# Patient Record
Sex: MALE | Race: WHITE | NOT HISPANIC OR LATINO | URBAN - METROPOLITAN AREA
[De-identification: names, ages, dates, MRNs, and addresses within clinical notes are randomized per-mention and may not be internally consistent; named-entity substitution may affect disease eponyms.]

---

## 2021-12-23 ENCOUNTER — OFFICE VISIT (OUTPATIENT)
Dept: URBAN - METROPOLITAN AREA CLINIC 45 | Facility: CLINIC | Age: 61
End: 2021-12-23
Payer: COMMERCIAL

## 2021-12-23 DIAGNOSIS — H40.1134 PRIMARY OPEN-ANGLE GLAUCOMA, BILATERAL, INDETERMINATE STAGE: ICD-10-CM

## 2021-12-23 DIAGNOSIS — H57.11 OCULAR PAIN, RIGHT EYE: ICD-10-CM

## 2021-12-23 DIAGNOSIS — Z94.7 CORNEAL TRANSPLANT STATUS: ICD-10-CM

## 2021-12-23 DIAGNOSIS — H04.123 DRY EYE SYNDROME OF BILATERAL LACRIMAL GLANDS: Primary | ICD-10-CM

## 2021-12-23 PROCEDURE — 99204 OFFICE O/P NEW MOD 45 MIN: CPT | Performed by: OPTOMETRIST

## 2021-12-23 RX ORDER — OFLOXACIN 3 MG/ML
0.3 % SOLUTION/ DROPS OPHTHALMIC
Qty: 5 | Refills: 0 | Status: INACTIVE
Start: 2021-12-23 | End: 2021-12-28

## 2021-12-23 RX ORDER — BRIMONIDINE TARTRATE 2 MG/ML
0.2 % SOLUTION/ DROPS OPHTHALMIC
Qty: 5 | Refills: 6 | Status: ACTIVE
Start: 2021-12-23

## 2021-12-23 ASSESSMENT — INTRAOCULAR PRESSURE
OS: 21
OD: 24

## 2021-12-23 NOTE — IMPRESSION/PLAN
Impression: Corneal transplant status: Z94.7.  Plan: PRED ACETATE QID OD, CONSULT WITH [facility] SPECIALIST

## 2021-12-23 NOTE — IMPRESSION/PLAN
Impression: Primary open-angle glaucoma, bilateral, indeterminate stage: H40.1134. Plan: START BRIMONIDINE TID OU, CONSULT WITH [facility] SPECIALIST. TUBE WELL COVERED.  PT IN A LOT OF PAIN, RECOMMEND HEAVY LUBRICATION AND PRED ACETATE QID OD

## 2021-12-28 ENCOUNTER — OFFICE VISIT (OUTPATIENT)
Dept: URBAN - METROPOLITAN AREA CLINIC 33 | Facility: CLINIC | Age: 61
End: 2021-12-28
Payer: COMMERCIAL

## 2021-12-28 DIAGNOSIS — T86.8401 CORNEAL TRANSPLANT REJECTION, RIGHT EYE: Primary | ICD-10-CM

## 2021-12-28 PROCEDURE — 99203 OFFICE O/P NEW LOW 30 MIN: CPT | Performed by: OPHTHALMOLOGY

## 2021-12-28 RX ORDER — OFLOXACIN 3 MG/ML
0.3 % SOLUTION/ DROPS OPHTHALMIC
Qty: 5 | Refills: 1 | Status: ACTIVE
Start: 2021-12-28

## 2021-12-28 RX ORDER — PREDNISOLONE ACETATE 10 MG/ML
1 % SUSPENSION/ DROPS OPHTHALMIC
Qty: 10 | Refills: 1 | Status: ACTIVE
Start: 2021-12-28

## 2021-12-28 NOTE — IMPRESSION/PLAN
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 Condition: quality of life issue. Vision: vision affected. TSSPCIOL OD, Valve OD, Valve OS  Condition: quality of life issue. Symptoms: could improve with surgery. Vision: vision affected. Plan: Discussed diagnosis in detail with patient. Discussed risks of progression. Discussed treatment options with patient. Surgical treatment is required. Surgical risks and benefits were discussed, explained and understood by patient. Patient elects to have surgery.  RL-2

## 2022-02-22 ENCOUNTER — SURGERY (OUTPATIENT)
Dept: URBAN - METROPOLITAN AREA SURGERY 15 | Facility: SURGERY | Age: 62
End: 2022-02-22
Payer: COMMERCIAL

## 2022-02-22 PROCEDURE — 65755 CORNEAL TRANSPLANT: CPT | Performed by: OPHTHALMOLOGY

## 2022-02-23 ENCOUNTER — POST-OPERATIVE VISIT (OUTPATIENT)
Dept: URBAN - METROPOLITAN AREA CLINIC 33 | Facility: CLINIC | Age: 62
End: 2022-02-23

## 2022-02-23 PROCEDURE — 99024 POSTOP FOLLOW-UP VISIT: CPT | Performed by: OPTOMETRIST

## 2022-02-23 ASSESSMENT — INTRAOCULAR PRESSURE: OD: 10

## 2022-02-23 NOTE — IMPRESSION/PLAN
Impression: S/P Penetrating keratoplasty OD - 1 Day. Encounter for surgical aftercare following surgery on a sense organ  Z48.810. Post operative instructions reviewed. Continue Ofloxacin and Prednisolone as directed.  Plan: --Continue Ofloxacin 0.3% QID OD 
--Continue Prednisolone acetate 1% QID OD

## 2022-02-28 ENCOUNTER — POST-OPERATIVE VISIT (OUTPATIENT)
Dept: URBAN - METROPOLITAN AREA CLINIC 33 | Facility: CLINIC | Age: 62
End: 2022-02-28
Payer: COMMERCIAL

## 2022-02-28 DIAGNOSIS — Z48.810 ENCOUNTER FOR SURGICAL AFTERCARE FOLLOWING SURGERY ON A SENSE ORGAN: Primary | ICD-10-CM

## 2022-02-28 PROCEDURE — 99024 POSTOP FOLLOW-UP VISIT: CPT | Performed by: OPHTHALMOLOGY

## 2022-02-28 ASSESSMENT — INTRAOCULAR PRESSURE
OS: 18
OD: 7

## 2022-02-28 NOTE — IMPRESSION/PLAN
Impression: S/P Penetrating keratoplasty OD - 6 Days. Encounter for surgical aftercare following surgery on a sense organ  Z48.810. Excellent post op course   Post operative instructions reviewed - Condition is improving - Plan: Pt has been commuting from [place] for surgery and post op. OK to extend post op 3 weeks. Discussed aniridia. Discussed artificial Iris. Defer at this time.  Ofloxacin QID OD X 1 week then D/C, Continue PF QID OD, do not taper, do not discontinue

## 2022-03-25 ENCOUNTER — POST-OPERATIVE VISIT (OUTPATIENT)
Dept: URBAN - METROPOLITAN AREA CLINIC 33 | Facility: CLINIC | Age: 62
End: 2022-03-25
Payer: COMMERCIAL

## 2022-03-25 PROCEDURE — 99024 POSTOP FOLLOW-UP VISIT: CPT | Performed by: OPHTHALMOLOGY

## 2022-03-25 RX ORDER — MOXIFLOXACIN 5 MG/ML
0.5 % SOLUTION/ DROPS OPHTHALMIC
Qty: 5 | Refills: 3 | Status: ACTIVE
Start: 2022-03-25

## 2022-03-25 RX ORDER — ATROPINE SULFATE 10 MG/ML
1 % SOLUTION/ DROPS OPHTHALMIC
Qty: 2.5 | Refills: 1 | Status: ACTIVE
Start: 2022-03-25

## 2022-03-25 ASSESSMENT — INTRAOCULAR PRESSURE: OD: 13

## 2022-03-25 NOTE — IMPRESSION/PLAN
Impression: S/P Penetrating keratoplasty OD - 31 Days. Encounter for surgical aftercare following surgery on a sense organ  Z48.810. Excellent post op course   Post operative instructions reviewed - Plan: Ofloxacin and Pred QID OD, Atropine BID OD.

## 2022-04-29 ENCOUNTER — POST-OPERATIVE VISIT (OUTPATIENT)
Dept: URBAN - METROPOLITAN AREA CLINIC 33 | Facility: CLINIC | Age: 62
End: 2022-04-29
Payer: COMMERCIAL

## 2022-04-29 DIAGNOSIS — Z48.810 ENCOUNTER FOR SURGICAL AFTERCARE FOLLOWING SURGERY ON A SENSE ORGAN: Primary | ICD-10-CM

## 2022-04-29 PROCEDURE — 99024 POSTOP FOLLOW-UP VISIT: CPT | Performed by: OPHTHALMOLOGY

## 2022-04-29 ASSESSMENT — INTRAOCULAR PRESSURE
OD: 7
OS: 12

## 2022-04-29 NOTE — IMPRESSION/PLAN
Impression: S/P Penetrating keratoplasty OD - 66 Days. Encounter for surgical aftercare following surgery on a sense organ  Z48.810. Excellent post op course   Post operative instructions reviewed - Condition is improving - Plan: Discussed possible custom contact lens vs implantable iris in the future for photophobia. OK to use Ofloxacin QD OD until gone. Continue PF QID OD.  ATS prn

## 2022-07-25 ENCOUNTER — OFFICE VISIT (OUTPATIENT)
Dept: URBAN - METROPOLITAN AREA CLINIC 33 | Facility: CLINIC | Age: 62
End: 2022-07-25
Payer: COMMERCIAL

## 2022-07-25 DIAGNOSIS — H43.311 VITREOUS MEMBRANES AND STRANDS, RIGHT EYE: ICD-10-CM

## 2022-07-25 DIAGNOSIS — H33.011 RETINAL DETACHMENT WITH SINGLE BREAK, RIGHT EYE: Primary | ICD-10-CM

## 2022-07-25 PROCEDURE — 92134 CPTRZ OPH DX IMG PST SGM RTA: CPT | Performed by: OPHTHALMOLOGY

## 2022-07-25 PROCEDURE — 99214 OFFICE O/P EST MOD 30 MIN: CPT | Performed by: OPHTHALMOLOGY

## 2022-07-25 RX ORDER — PREDNISOLONE ACETATE 10 MG/ML
1 % SUSPENSION/ DROPS OPHTHALMIC
Qty: 10 | Refills: 5 | Status: ACTIVE
Start: 2022-07-25

## 2022-07-25 RX ORDER — OFLOXACIN 3 MG/ML
0.3 % SOLUTION/ DROPS OPHTHALMIC
Qty: 5 | Refills: 3 | Status: ACTIVE
Start: 2022-07-25

## 2022-07-25 ASSESSMENT — INTRAOCULAR PRESSURE
OD: 5
OS: 12

## 2022-07-25 NOTE — IMPRESSION/PLAN
Impression: Complex Retinal detachment with single break, right eye: H33.011. Right. Condition: new problem addtl w/u needed. Vision: vision affected. Plan: Discussed diagnosis in detail with patient. Discussed risks of progression. Discussed concerns due to Hx of Corneal Transplant, previous TSPCIOL and present Scar Tissue. Also patient drove from [place]. Discussed surgery with Gas vs S. O. Surgical treatment is recommended to repair the retina PPVx, EL, AFx, ERMx, NO GAS, possible Perf, S. O. MB, Kenalog RIGHT EYE. Surgical risks and benefits were discussed, explained and understood by patient. Unable to tell how much vision will be recovered. Discussed S. O., patient will be able to travel with S. O. Advise patient that once the retina is stable post surgery, he will need additional surgery to remove the S. O. All questions answered. Patient elects to proceed with recommendation. RL1. Educational material provided to patient. Erx Prednisolone and Ofloxacin to patient's pharmacy.

## 2022-07-31 ENCOUNTER — POST-OPERATIVE VISIT (OUTPATIENT)
Dept: URBAN - METROPOLITAN AREA CLINIC 33 | Facility: CLINIC | Age: 62
End: 2022-07-31
Payer: COMMERCIAL

## 2022-07-31 DIAGNOSIS — Z96.1 PRESENCE OF INTRAOCULAR LENS: Primary | ICD-10-CM

## 2022-07-31 PROCEDURE — 99024 POSTOP FOLLOW-UP VISIT: CPT | Performed by: OPTOMETRIST

## 2022-07-31 ASSESSMENT — INTRAOCULAR PRESSURE
OD: 8
OS: 15

## 2022-07-31 NOTE — IMPRESSION/PLAN
Impression: S/P 45510; PARS PLANA VITRECTOMY 25G; Laser photocoagulation - ENDOLASER; AFX; Epiretinal Membranectomy; ; Intravitreal injection of medications - KENALOG OD - 1 Day. Presence of intraocular lens  Z96.1. Post operative instructions reviewed - Plan: Discussed findings with patient and re-educated on PO instructions. Instructed patient to call if any significant pain, loss of vision, flashes or new floaters.  --Continue all meds

## 2022-08-05 ENCOUNTER — POST-OPERATIVE VISIT (OUTPATIENT)
Dept: URBAN - METROPOLITAN AREA CLINIC 23 | Facility: CLINIC | Age: 62
End: 2022-08-05
Payer: COMMERCIAL

## 2022-08-05 DIAGNOSIS — Z48.810 ENCOUNTER FOR SURGICAL AFTERCARE FOLLOWING SURGERY ON A SENSE ORGAN: Primary | ICD-10-CM

## 2022-08-05 PROCEDURE — 99024 POSTOP FOLLOW-UP VISIT: CPT | Performed by: OPHTHALMOLOGY

## 2022-08-05 ASSESSMENT — INTRAOCULAR PRESSURE
OD: 7
OS: 13

## 2022-08-05 NOTE — IMPRESSION/PLAN
Impression: S/P 41989; PARS PLANA VITRECTOMY 25G; Laser photocoagulation - ENDOLASER; AFX; Epiretinal Membranectomy; ; Intravitreal injection of medications - KENALOG OD - 6 Days. Encounter for surgical aftercare following surgery on a sense organ  Z48.810. Excellent post op course   Post operative instructions reviewed - Condition is improving - Plan: Exam and Optos shows the retina to be attached with silicone oil. Pt may travel without regard to elevation changes since silicone oil was used. Pt may d/c. ofloxacin and continue the Prednisolone qid OD. OCT shows stable. Reminded pt to shake the Prednisolone bottle before use. Pt may sleep with a pillow or 2, do not lay flat. Pt may lift under 20 pounds. Will reassess the retina in 3-4 weeks.

## 2022-09-01 ENCOUNTER — POST-OPERATIVE VISIT (OUTPATIENT)
Dept: URBAN - METROPOLITAN AREA CLINIC 33 | Facility: CLINIC | Age: 62
End: 2022-09-01
Payer: COMMERCIAL

## 2022-09-01 DIAGNOSIS — Z48.810 ENCOUNTER FOR SURGICAL AFTERCARE FOLLOWING SURGERY ON A SENSE ORGAN: Primary | ICD-10-CM

## 2022-09-01 PROCEDURE — 99024 POSTOP FOLLOW-UP VISIT: CPT | Performed by: OPHTHALMOLOGY

## 2022-09-01 RX ORDER — PREDNISOLONE ACETATE 10 MG/ML
1 % SUSPENSION/ DROPS OPHTHALMIC
Qty: 10 | Refills: 5 | Status: ACTIVE
Start: 2022-09-01

## 2022-09-01 ASSESSMENT — INTRAOCULAR PRESSURE
OS: 12
OD: 4

## 2022-09-01 NOTE — IMPRESSION/PLAN
Impression: S/P 56109; PARS PLANA VITRECTOMY 25G; Laser photocoagulation - ENDOLASER; AFX; Epiretinal Membranectomy; ; Intravitreal injection of medications - KENALOG OD - 33 Days. Encounter for surgical aftercare following surgery on a sense organ  Z48.810. Plan: OCT OD shows no view and Optos OD shows fibrosis. Exam shows retina appears to be attached w/ S.O. - hazy view. Recommend increasing Prednisolone Q1-2hrs while awake. Will reassess the retina in 3 weeks.

## 2022-09-29 ENCOUNTER — POST-OPERATIVE VISIT (OUTPATIENT)
Dept: URBAN - METROPOLITAN AREA CLINIC 33 | Facility: CLINIC | Age: 62
End: 2022-09-29
Payer: COMMERCIAL

## 2022-09-29 DIAGNOSIS — Z48.810 ENCOUNTER FOR SURGICAL AFTERCARE FOLLOWING SURGERY ON A SENSE ORGAN: Primary | ICD-10-CM

## 2022-09-29 PROCEDURE — 99024 POSTOP FOLLOW-UP VISIT: CPT | Performed by: OPHTHALMOLOGY

## 2022-09-29 ASSESSMENT — INTRAOCULAR PRESSURE
OS: 13
OD: 4

## 2022-09-29 NOTE — IMPRESSION/PLAN
Impression: S/P 91576; PARS PLANA VITRECTOMY 25G; Laser photocoagulation - ENDOLASER; AFX; Epiretinal Membranectomy; ; Intravitreal injection of medications - KENALOG OD - 61 Days. Encounter for surgical aftercare following surgery on a sense organ  Z48.810. Excellent post op course; Condition is improving - Plan: Unable to obtain OCT and Optos OD. Exam OD shows minimal corneal edema, macula appears attached, fluid underneath the retina inferiorly. Continue Prednisolone Q2hrs OD while awake. Will consider surgery remove S.O., and place heavy liquid to repair the retina. Will reassess the retina in 3 weeks.

## 2022-10-17 ENCOUNTER — POST-OPERATIVE VISIT (OUTPATIENT)
Dept: URBAN - METROPOLITAN AREA CLINIC 33 | Facility: CLINIC | Age: 62
End: 2022-10-17
Payer: COMMERCIAL

## 2022-10-17 DIAGNOSIS — Z48.810 ENCOUNTER FOR SURGICAL AFTERCARE FOLLOWING SURGERY ON A SENSE ORGAN: Primary | ICD-10-CM

## 2022-10-17 PROCEDURE — 99024 POSTOP FOLLOW-UP VISIT: CPT | Performed by: OPHTHALMOLOGY

## 2022-10-17 RX ORDER — OFLOXACIN 3 MG/ML
0.3 % SOLUTION/ DROPS OPHTHALMIC
Qty: 5 | Refills: 3 | Status: ACTIVE
Start: 2022-10-17

## 2022-10-17 RX ORDER — PREDNISOLONE ACETATE 10 MG/ML
1 % SUSPENSION/ DROPS OPHTHALMIC
Qty: 10 | Refills: 5 | Status: ACTIVE
Start: 2022-10-17

## 2022-10-17 ASSESSMENT — INTRAOCULAR PRESSURE
OS: 14
OD: 4

## 2022-10-17 NOTE — IMPRESSION/PLAN
Impression: S/P 16886; PARS PLANA VITRECTOMY 25G; Laser photocoagulation - ENDOLASER; AFX; Epiretinal Membranectomy; ; Intravitreal injection of medications - KENALOG OD - 79 Days. Encounter for surgical aftercare following surgery on a sense organ  Z48.810. Excellent post op course; Condition is improving - Plan: Exam OD shows corneal edema is improving, retina partially attached. Unable to obtain OCT OD. Discussed diagnosis in detail with patient. Discussed risks of progression. Surgical treatment is recommended to repair the retina with heavy liquid and remove scar tissue PPVx RIGHT EYE. Surgical risks and benefits were discussed, explained and understood by patient. Unable to tell how much vision will be recovered. All questions answered. Patient elects to proceed with recommendation. RL1. Continue Prednisolone Q2hrs OD (e'rxed refills for Ofloxacin and Prednisolone).

## 2022-11-03 ENCOUNTER — POST-OPERATIVE VISIT (OUTPATIENT)
Dept: URBAN - METROPOLITAN AREA CLINIC 33 | Facility: CLINIC | Age: 62
End: 2022-11-03
Payer: COMMERCIAL

## 2022-11-03 DIAGNOSIS — Z48.810 ENCOUNTER FOR SURGICAL AFTERCARE FOLLOWING SURGERY ON A SENSE ORGAN: Primary | ICD-10-CM

## 2022-11-03 PROCEDURE — 99024 POSTOP FOLLOW-UP VISIT: CPT | Performed by: OPTOMETRIST

## 2022-11-03 ASSESSMENT — INTRAOCULAR PRESSURE: OD: 15

## 2022-11-03 NOTE — IMPRESSION/PLAN
Impression: S/P 25ga PPVx; Epiretinal Membranectomy; ICG, perflurocorbon; oil removal; Intraocular Injections of gas or other vitreous substitutes OD - 1 Day. Encounter for surgical aftercare following surgery on a sense organ  Z48.810. Post operative instructions reviewed -Sit upright for 10 days.  Continue care with Dr. Shannan Lomas: Continue Prednisolone and Ofloxacin QID OD

## 2022-11-10 ENCOUNTER — POST-OPERATIVE VISIT (OUTPATIENT)
Dept: URBAN - METROPOLITAN AREA CLINIC 33 | Facility: CLINIC | Age: 62
End: 2022-11-10
Payer: COMMERCIAL

## 2022-11-10 DIAGNOSIS — Z48.810 ENCOUNTER FOR SURGICAL AFTERCARE FOLLOWING SURGERY ON A SENSE ORGAN: Primary | ICD-10-CM

## 2022-11-10 PROCEDURE — 99024 POSTOP FOLLOW-UP VISIT: CPT | Performed by: OPHTHALMOLOGY

## 2022-11-10 ASSESSMENT — INTRAOCULAR PRESSURE: OD: 10

## 2022-11-10 NOTE — IMPRESSION/PLAN
Impression: S/P 25ga PPVx; Epiretinal Membranectomy; ICG, perflurocorbon; oil removal; Intraocular Injections of gas or other vitreous substitutes OD - 8 Days. Encounter for surgical aftercare following surgery on a sense organ  Z48.810. Excellent post op course   Post operative instructions reviewed - Condition is improving - Plan: Exam OD shows the retina appears in good position w/Perfl. Will reassess in 2 wks, if stable will discussed Perfl removal. Continue PF OD QID and d/c Ofloxacin after today.

## 2022-11-28 ENCOUNTER — POST-OPERATIVE VISIT (OUTPATIENT)
Dept: URBAN - METROPOLITAN AREA CLINIC 33 | Facility: CLINIC | Age: 62
End: 2022-11-28
Payer: COMMERCIAL

## 2022-11-28 DIAGNOSIS — Z48.810 ENCOUNTER FOR SURGICAL AFTERCARE FOLLOWING SURGERY ON A SENSE ORGAN: Primary | ICD-10-CM

## 2022-11-28 PROCEDURE — 99024 POSTOP FOLLOW-UP VISIT: CPT | Performed by: OPHTHALMOLOGY

## 2022-11-28 ASSESSMENT — INTRAOCULAR PRESSURE
OD: 5
OS: 12

## 2022-11-28 NOTE — IMPRESSION/PLAN
Impression: S/P 25ga PPVx; Epiretinal Membranectomy; ICG, perflurocorbon; oil removal; Intraocular Injections of gas or other vitreous substitutes OD - 26 Days. Encounter for surgical aftercare following surgery on a sense organ  Z48.810. Excellent post op course   Post operative instructions reviewed - Condition is improving - Plan: Exam OD shows the retina appears to be in good position. OCT OD unable and Optos OD shows a hazy view. Discussed and recommend surgery to remove the Pefl for possible vision improvement. Discussed surgery in great detail with risks and benefits. All questions answered. Patient elects to proceed with recommendation. Schedule PPVx 25 ga, AFx, Perf Removal, Kenalog, PCO / ACO removal  RIGHT EYE. Advise to continue using Prednisolone OD QID until surgery, restart Ofloxacin 1 day prior to sx (pt has refills at his pharmacy.

## 2022-12-15 ENCOUNTER — POST-OPERATIVE VISIT (OUTPATIENT)
Dept: URBAN - METROPOLITAN AREA CLINIC 33 | Facility: CLINIC | Age: 62
End: 2022-12-15
Payer: COMMERCIAL

## 2022-12-15 DIAGNOSIS — Z48.810 ENCOUNTER FOR SURGICAL AFTERCARE FOLLOWING SURGERY ON A SENSE ORGAN: Primary | ICD-10-CM

## 2022-12-15 PROCEDURE — 99024 POSTOP FOLLOW-UP VISIT: CPT | Performed by: OPTOMETRIST

## 2022-12-15 ASSESSMENT — INTRAOCULAR PRESSURE
OD: 12
OS: 12

## 2022-12-15 NOTE — IMPRESSION/PLAN
Impression: S/P [place] Vitrectomy 25ga; Perfluoron Removal; Epiretinal Membranectomy; AFX (Air Fluid Gas Exchange); Endo laser OD - 1 Day. Encounter for surgical aftercare following surgery on a sense organ  Z48.810. Patient to be upright or Left side down for 10 days per Dr Suraj Guadalupe. No increase in altitude or elevation by car or plane until instructed. Continue care with Dr. Suraj Guadalupe. Plan: Start Prednisolone QID OD Start Ofloxacin QID OD

## 2022-12-22 ENCOUNTER — POST-OPERATIVE VISIT (OUTPATIENT)
Dept: URBAN - METROPOLITAN AREA CLINIC 33 | Facility: CLINIC | Age: 62
End: 2022-12-22
Payer: COMMERCIAL

## 2022-12-22 DIAGNOSIS — Z48.810 ENCOUNTER FOR SURGICAL AFTERCARE FOLLOWING SURGERY ON A SENSE ORGAN: Primary | ICD-10-CM

## 2022-12-22 PROCEDURE — 99024 POSTOP FOLLOW-UP VISIT: CPT | Performed by: OPHTHALMOLOGY

## 2022-12-22 ASSESSMENT — INTRAOCULAR PRESSURE
OS: 14
OD: 13

## 2022-12-22 NOTE — IMPRESSION/PLAN
Impression: S/P [place] Vitrectomy 25ga; Perfluoron Removal; Epiretinal Membranectomy; AFX (Air Fluid Gas Exchange); Endo laser OD - 8 Days. Encounter for surgical aftercare following surgery on a sense organ  Z48.810. Excellent post op course   Post operative instructions reviewed - Plan: Exam OD shows a hazy view due to gas bubble. Good red reflex, the retina appears grossly attached. Pt may d/ Ofloxacin and continue Prednisolone QID OD. Will reassess the retain in 4 weeks. Advised pateint that he still may not travel to higher elevations.

## 2023-01-23 ENCOUNTER — POST-OPERATIVE VISIT (OUTPATIENT)
Dept: URBAN - METROPOLITAN AREA CLINIC 33 | Facility: CLINIC | Age: 63
End: 2023-01-23
Payer: COMMERCIAL

## 2023-01-23 DIAGNOSIS — Z48.810 ENCOUNTER FOR SURGICAL AFTERCARE FOLLOWING SURGERY ON A SENSE ORGAN: Primary | ICD-10-CM

## 2023-01-23 PROCEDURE — 99024 POSTOP FOLLOW-UP VISIT: CPT | Performed by: OPHTHALMOLOGY

## 2023-01-23 NOTE — IMPRESSION/PLAN
Impression: S/P [place] Vitrectomy 25ga; Perfluoron Removal; Epiretinal Membranectomy; AFX (Air Fluid Gas Exchange); Endo laser OD - 40 Days. Encounter for surgical aftercare following surgery on a sense organ  Z48.810. Excellent post op course   Post operative instructions reviewed - Plan: Exam OD shows [facility]. Pt states that he has had a cold and coughing and sneezing and he now has blood on the eye. Explained to patient that the blood on the front of the eye is normal and harmless. The retina appears stable and attached, with gas bubble. Will reassess the retina in 4 weeks. Vision should improve as the gas bubble decreases.

## 2023-02-20 ENCOUNTER — POST-OPERATIVE VISIT (OUTPATIENT)
Dept: URBAN - METROPOLITAN AREA CLINIC 33 | Facility: CLINIC | Age: 63
End: 2023-02-20
Payer: COMMERCIAL

## 2023-02-20 DIAGNOSIS — Z48.810 ENCOUNTER FOR SURGICAL AFTERCARE FOLLOWING SURGERY ON A SENSE ORGAN: Primary | ICD-10-CM

## 2023-02-20 PROCEDURE — 99024 POSTOP FOLLOW-UP VISIT: CPT | Performed by: OPHTHALMOLOGY

## 2023-02-20 RX ORDER — PREDNISOLONE ACETATE 10 MG/ML
1 % SUSPENSION/ DROPS OPHTHALMIC
Qty: 10 | Refills: 5 | Status: ACTIVE
Start: 2023-02-20

## 2023-02-20 ASSESSMENT — INTRAOCULAR PRESSURE
OS: 9
OD: 0

## 2023-02-20 NOTE — IMPRESSION/PLAN
Impression: S/P [place] Vitrectomy 25ga; Perfluoron Removal; Epiretinal Membranectomy; AFX (Air Fluid Gas Exchange); Endo laser OD - 68 Days. Encounter for surgical aftercare following surgery on a sense organ  Z48.810. Excellent post op course   Post operative instructions reviewed - Plan: Exam OD shows inflammation on the IOL, Hazy view. When light beam from the slit lamp entered patients eye, he was able to see it. Pt to increase Prednisolone 4-8x daily OD. Will reassess the retina in 1 month.   OCT OD unable

## 2023-03-20 ENCOUNTER — OFFICE VISIT (OUTPATIENT)
Dept: URBAN - METROPOLITAN AREA CLINIC 33 | Facility: CLINIC | Age: 63
End: 2023-03-20
Payer: COMMERCIAL

## 2023-03-20 DIAGNOSIS — H33.011 RETINAL DETACHMENT WITH SINGLE BREAK, RIGHT EYE: Primary | ICD-10-CM

## 2023-03-20 PROCEDURE — 99213 OFFICE O/P EST LOW 20 MIN: CPT | Performed by: OPHTHALMOLOGY

## 2023-03-20 PROCEDURE — 76512 OPH US DX B-SCAN: CPT | Performed by: OPHTHALMOLOGY

## 2023-03-20 ASSESSMENT — INTRAOCULAR PRESSURE
OS: 12
OD: 13

## 2023-03-20 NOTE — IMPRESSION/PLAN
Impression: s/p PPVx for Repair of Complex Retinal detachment with single break, right eye x 3 surgeries: H33.011. Right. Condition: stable post sx. Vision: vision affected. s/p PPVx for Repair of Complex RD OD 12/14/22 w/gas
s/p PPVx for Repair of Complex RD OD 11/02 w/Perf
s/p PPVx for Repair of Complex RD OD 07/30 w/S. O. Plan: Discussed diagnosis in detail with patient. Will proceed with B- Scan to assess the retina. B-Scan OD shows no RD. Patient can travel, there is no gas in the eye. Recommend a follow - up in 2 mos. Continue with PF OD 6 - 8 x's a day. Patient deferred OCT and Optos.

## 2023-03-23 ENCOUNTER — OFFICE VISIT (OUTPATIENT)
Dept: URBAN - METROPOLITAN AREA CLINIC 45 | Facility: CLINIC | Age: 63
End: 2023-03-23
Payer: COMMERCIAL

## 2023-03-23 DIAGNOSIS — T86.8401 CORNEAL TRANSPLANT REJECTION, RIGHT EYE: Primary | ICD-10-CM

## 2023-03-23 DIAGNOSIS — H52.4 PRESBYOPIA: ICD-10-CM

## 2023-03-23 DIAGNOSIS — H17.89 OTHER CORNEAL SCARS AND OPACITIES: ICD-10-CM

## 2023-03-23 PROCEDURE — 99213 OFFICE O/P EST LOW 20 MIN: CPT | Performed by: OPTOMETRIST

## 2023-03-23 ASSESSMENT — VISUAL ACUITY
OD: LP
OS: 20/60

## 2023-03-23 NOTE — IMPRESSION/PLAN
Impression: Other corneal scars and opacities: H17.89.  Plan: continue Pred q 2 hrs 

keep appt with Dr. Estella Mera

## 2023-03-23 NOTE — IMPRESSION/PLAN
Impression: Corneal transplant rejection, right eye: A80.7154.  Plan: recommend consult with Dr. Osmel Colindres

## 2023-03-31 ENCOUNTER — OFFICE VISIT (OUTPATIENT)
Dept: URBAN - METROPOLITAN AREA CLINIC 45 | Facility: CLINIC | Age: 63
End: 2023-03-31
Payer: COMMERCIAL

## 2023-03-31 DIAGNOSIS — Z94.7 CORNEAL TRANSPLANT STATUS: Primary | ICD-10-CM

## 2023-03-31 DIAGNOSIS — H52.4 PRESBYOPIA: ICD-10-CM

## 2023-03-31 DIAGNOSIS — H04.123 DRY EYE SYNDROME OF BILATERAL LACRIMAL GLANDS: ICD-10-CM

## 2023-03-31 PROCEDURE — 99212 OFFICE O/P EST SF 10 MIN: CPT | Performed by: OPTOMETRIST

## 2023-03-31 ASSESSMENT — VISUAL ACUITY: OS: 20/40

## 2023-03-31 NOTE — IMPRESSION/PLAN
Impression: Corneal transplant status: Z94.7.  Plan: continue pred acetate as directed by Dr. Norberto Vazquez, consult with Dr. Sierra Roque
Impression: Dry eye syndrome of bilateral lacrimal glands: H04.123. Plan: Patient instructed to use artificial tears as needed.
WDL

## 2023-04-05 ENCOUNTER — OFFICE VISIT (OUTPATIENT)
Dept: URBAN - METROPOLITAN AREA CLINIC 33 | Facility: CLINIC | Age: 63
End: 2023-04-05
Payer: COMMERCIAL

## 2023-04-05 DIAGNOSIS — H18.231 SECONDARY CORNEAL EDEMA OF RIGHT EYE: Primary | ICD-10-CM

## 2023-04-05 PROCEDURE — 99213 OFFICE O/P EST LOW 20 MIN: CPT | Performed by: OPHTHALMOLOGY

## 2023-04-05 ASSESSMENT — INTRAOCULAR PRESSURE
OS: 13
OD: 4

## 2023-04-05 NOTE — IMPRESSION/PLAN
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 S/P PKP OD, TSSPCIOL OD Plan: Discussed diagnosis in detail with patient. Discussed treatment options with patient. Current therapy is best for patient. Continue using current medication(s). Continue Pred Acetate Q2H OD, stressed compliance.

## 2023-06-22 ENCOUNTER — OFFICE VISIT (OUTPATIENT)
Dept: URBAN - METROPOLITAN AREA CLINIC 33 | Facility: CLINIC | Age: 63
End: 2023-06-22
Payer: COMMERCIAL

## 2023-06-22 DIAGNOSIS — H33.011 RETINAL DETACHMENT WITH SINGLE BREAK, RIGHT EYE: Primary | ICD-10-CM

## 2023-06-22 PROCEDURE — 76512 OPH US DX B-SCAN: CPT | Performed by: OPHTHALMOLOGY

## 2023-06-22 PROCEDURE — 99213 OFFICE O/P EST LOW 20 MIN: CPT | Performed by: OPHTHALMOLOGY

## 2023-06-22 PROCEDURE — 92134 CPTRZ OPH DX IMG PST SGM RTA: CPT | Performed by: OPHTHALMOLOGY

## 2023-06-22 ASSESSMENT — INTRAOCULAR PRESSURE
OS: 13
OD: 0

## 2023-06-22 NOTE — IMPRESSION/PLAN
Impression: s/p PPVx for Repair of Complex Retinal detachment with single break, right eye x 3 surgeries: H33.011. Right. Condition: stable post sx. Vision: vision affected. s/p PPVx for Repair of Complex RD OD 12/14/22 w/gas (perfl removal) s/p PPVx for Repair of Complex RD OD 11/02 w/Perf
s/p PPVx for Repair of Complex RD OD 07/30 w/S. O. Plan: Discussed diagnosis in detail with patient. Will proceed with B- Scan to assess the retina. B-Scan OD shows no RD. Exam OD shows the cornea is cloudy. Recommend to follow-up with Dr. Bony Gonzalez, he may need another Corneal Transplant. The Intraocular Lens appears in place and the retina is attached. Schedule an appt to see Dr. Bony Gonzalez tomorrow (pt only travel to [facility] only for his eye appts). Continue with PF OD 6 - 8 x's a day.

## 2023-06-23 ENCOUNTER — OFFICE VISIT (OUTPATIENT)
Dept: URBAN - METROPOLITAN AREA CLINIC 33 | Facility: CLINIC | Age: 63
End: 2023-06-23
Payer: COMMERCIAL

## 2023-06-23 DIAGNOSIS — H44.441 PRIMARY HYPOTONY OF RIGHT EYE: Primary | ICD-10-CM

## 2023-06-23 PROCEDURE — 99213 OFFICE O/P EST LOW 20 MIN: CPT | Performed by: OPHTHALMOLOGY

## 2023-06-23 ASSESSMENT — INTRAOCULAR PRESSURE: OS: 14

## 2023-06-23 NOTE — IMPRESSION/PLAN
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 Right. Plan: Discussed diagnosis in detail with patient. Discussed risks and benefits and patient understands. Advised patient of condition. Hypotony of the eye is causing the cornea transplant to decompose. Discussed possibly removing the shunt in chances to elevate the pressure, guarded prognosis. Recommend consult with Glaucoma for second opinion.  
Continue instilling PF OD

## 2023-06-30 ENCOUNTER — OFFICE VISIT (OUTPATIENT)
Dept: URBAN - METROPOLITAN AREA CLINIC 51 | Facility: CLINIC | Age: 63
End: 2023-06-30
Payer: COMMERCIAL

## 2023-06-30 DIAGNOSIS — H40.1121 PRIMARY OPEN-ANGLE GLAUCOMA, MILD STAGE, LEFT EYE: Primary | ICD-10-CM

## 2023-06-30 DIAGNOSIS — H40.1114 PRIMARY OPEN-ANGLE GLAUCOMA, RIGHT EYE, INDETERMINATE STAGE: ICD-10-CM

## 2023-06-30 DIAGNOSIS — T86.8401 CORNEAL TRANSPLANT REJECTION, RIGHT EYE: ICD-10-CM

## 2023-06-30 DIAGNOSIS — H44.441 PRIMARY HYPOTONY OF RIGHT EYE: ICD-10-CM

## 2023-06-30 DIAGNOSIS — H43.812 VITREOUS DEGENERATION, LEFT EYE: ICD-10-CM

## 2023-06-30 PROCEDURE — 76514 ECHO EXAM OF EYE THICKNESS: CPT | Performed by: STUDENT IN AN ORGANIZED HEALTH CARE EDUCATION/TRAINING PROGRAM

## 2023-06-30 PROCEDURE — 99204 OFFICE O/P NEW MOD 45 MIN: CPT | Performed by: STUDENT IN AN ORGANIZED HEALTH CARE EDUCATION/TRAINING PROGRAM

## 2023-06-30 PROCEDURE — 92083 EXTENDED VISUAL FIELD XM: CPT | Performed by: STUDENT IN AN ORGANIZED HEALTH CARE EDUCATION/TRAINING PROGRAM

## 2023-06-30 PROCEDURE — 92133 CPTRZD OPH DX IMG PST SGM ON: CPT | Performed by: STUDENT IN AN ORGANIZED HEALTH CARE EDUCATION/TRAINING PROGRAM

## 2023-06-30 RX ORDER — PREDNISOLONE ACETATE 10 MG/ML
1 % SUSPENSION/ DROPS OPHTHALMIC
Qty: 10 | Refills: 5 | Status: ACTIVE
Start: 2023-06-30

## 2023-06-30 RX ORDER — OFLOXACIN 3 MG/ML
0.3 % SOLUTION/ DROPS OPHTHALMIC
Qty: 5 | Refills: 0 | Status: ACTIVE
Start: 2023-06-30

## 2023-06-30 ASSESSMENT — INTRAOCULAR PRESSURE
OD: 3
OS: 13

## 2023-06-30 NOTE — IMPRESSION/PLAN
Impression: Primary open-angle glaucoma, mild stage, left eye: H40.1121. Plan: Ocular Surgical History: s/p IOL OU, tube OU, PRK OU, PK OD, multiple retina sx OD Pachy: 1331/560 Tmax: 24/30 (per pt OD max was 55 mm HG) Target IOP: low teens OU Med Allergies: none Family Hx/ Heart / Lung / Kidney disease/sulfa allergy: Pt. denies Gonioscopy: PENDING
OCT: unable OD/85 early sup thinning HVF: unable OD/OS: MD -3 NS changes C/D: OS: .4 Better seeing eye: OS
IOP Today: 3/13 Plan: IOP OS is doing well. IOP OD is slightly higher today, but low. Patient would like to get tube shunt out OD. Explained to patient that with all other issues going on with OD, at high risk with taking tube out. Drops: No glaucoma Drops. Discussed natural history of glaucoma and that irreversible vision loss can occur without adequate IOP control. Patient advised to use drops as directed, EVEN on mornings of appointments. Poor compliance can lead to blindness.

## 2023-06-30 NOTE — IMPRESSION/PLAN
Impression: Primary open-angle glaucoma, right eye, indeterminate stage: M72.5995.
2/2 RD/Cornal transplant *Tube most likely in sulcus* Plan: See G33.4484 & H44.763.
-Glaucoma Tube Shunt Removal is recommended. Glaucoma diagnosis and associated vision loss discussed at length. R/B/A of surgery discussed including pain, bleeding, loss of vision, loss of eye, need for further surgery, double vision, retinal problems, infection, and inflammation. Patient verbally expressed understanding of HIGH RISK surgery. Although surgery is expected to improve the condition, the patient understands it is possible that the condition could worsen in the future. Patient understands vision improvement is not expected. Discussed in detail the commitment of post operative care, patient may need to travel to different offices. Discussed post-op activity restrictions: no bending head below waist, rubbing the eye, straining or lifting over 10 lbs, no swimming, stay out of [place], dirty areas and shield when sleeping until advised. All questions answered. Pt understand and wishes to proceed. ***Clearance- Blood thinner (Clopidigrel) - ERx'd Ofloxacin OD QID x2 weeks, and Prednisolone Q2Hrs OD.

## 2023-06-30 NOTE — IMPRESSION/PLAN
Impression: Corneal transplant rejection, right eye: S06.8015. Plan: Continue care with Dr. Santos Kay. Continue Prednisolone 6x/day (q3hrs) OD.

## 2023-06-30 NOTE — IMPRESSION/PLAN
Impression: Vitreous degeneration, left eye: H43.812. Plan: Undilated; Patient to call/come in with RD symptoms.

## 2023-07-27 ENCOUNTER — SURGERY (OUTPATIENT)
Dept: URBAN - METROPOLITAN AREA SURGERY 28 | Facility: LOCATION | Age: 63
End: 2023-07-27
Payer: COMMERCIAL

## 2023-07-27 PROCEDURE — 65920 REMOVE IMPLANT OF EYE: CPT | Performed by: STUDENT IN AN ORGANIZED HEALTH CARE EDUCATION/TRAINING PROGRAM

## 2023-07-28 ENCOUNTER — POST-OPERATIVE VISIT (OUTPATIENT)
Dept: URBAN - METROPOLITAN AREA CLINIC 51 | Facility: CLINIC | Age: 63
End: 2023-07-28
Payer: COMMERCIAL

## 2023-07-28 DIAGNOSIS — Z48.810 ENCOUNTER FOR SURGICAL AFTERCARE FOLLOWING SURGERY ON A SENSE ORGAN: Primary | ICD-10-CM

## 2023-07-28 PROCEDURE — 99024 POSTOP FOLLOW-UP VISIT: CPT | Performed by: OPTOMETRIST

## 2023-07-28 ASSESSMENT — INTRAOCULAR PRESSURE: OD: 12

## 2023-08-23 ENCOUNTER — OFFICE VISIT (OUTPATIENT)
Dept: URBAN - METROPOLITAN AREA CLINIC 33 | Facility: CLINIC | Age: 63
End: 2023-08-23
Payer: OTHER GOVERNMENT

## 2023-08-23 DIAGNOSIS — Z94.7 CORNEAL TRANSPLANT STATUS: Primary | ICD-10-CM

## 2023-08-23 DIAGNOSIS — H18.231 SECONDARY CORNEAL EDEMA OF RIGHT EYE: ICD-10-CM

## 2023-08-23 PROCEDURE — 99213 OFFICE O/P EST LOW 20 MIN: CPT | Performed by: OPHTHALMOLOGY

## 2023-08-23 ASSESSMENT — INTRAOCULAR PRESSURE
OS: 12
OD: 10

## 2023-09-20 ENCOUNTER — OFFICE VISIT (OUTPATIENT)
Dept: URBAN - METROPOLITAN AREA CLINIC 33 | Facility: CLINIC | Age: 63
End: 2023-09-20
Payer: COMMERCIAL

## 2023-09-20 DIAGNOSIS — H18.231 SECONDARY CORNEAL EDEMA, RIGHT EYE: Primary | ICD-10-CM

## 2023-09-20 PROCEDURE — 99213 OFFICE O/P EST LOW 20 MIN: CPT | Performed by: OPHTHALMOLOGY

## 2023-09-20 ASSESSMENT — INTRAOCULAR PRESSURE
OD: 11
OS: 18
OD: 0

## 2023-10-20 ENCOUNTER — OFFICE VISIT (OUTPATIENT)
Dept: URBAN - METROPOLITAN AREA CLINIC 23 | Facility: CLINIC | Age: 63
End: 2023-10-20
Payer: COMMERCIAL

## 2023-10-20 DIAGNOSIS — H33.011 RETINAL DETACHMENT WITH SINGLE BREAK, RIGHT EYE: Primary | ICD-10-CM

## 2023-10-20 PROCEDURE — 76512 OPH US DX B-SCAN: CPT | Performed by: OPHTHALMOLOGY

## 2023-12-19 ENCOUNTER — OFFICE VISIT (OUTPATIENT)
Dept: URBAN - METROPOLITAN AREA CLINIC 23 | Facility: CLINIC | Age: 63
End: 2023-12-19
Payer: COMMERCIAL

## 2023-12-19 DIAGNOSIS — Z94.7 CORNEAL TRANSPLANT STATUS: Primary | ICD-10-CM

## 2023-12-19 PROCEDURE — 99213 OFFICE O/P EST LOW 20 MIN: CPT | Performed by: OPHTHALMOLOGY

## 2023-12-19 RX ORDER — PREDNISOLONE ACETATE 10 MG/ML
1 % SUSPENSION/ DROPS OPHTHALMIC
Qty: 10 | Refills: 5 | Status: ACTIVE
Start: 2023-12-19

## 2023-12-19 ASSESSMENT — INTRAOCULAR PRESSURE
OD: 8
OS: 18

## 2024-03-20 ENCOUNTER — OFFICE VISIT (OUTPATIENT)
Dept: URBAN - METROPOLITAN AREA CLINIC 23 | Facility: CLINIC | Age: 64
End: 2024-03-20
Payer: COMMERCIAL

## 2024-03-20 DIAGNOSIS — Z94.7 CORNEAL TRANSPLANT STATUS: Primary | ICD-10-CM

## 2024-03-20 PROCEDURE — 99213 OFFICE O/P EST LOW 20 MIN: CPT | Performed by: OPHTHALMOLOGY

## 2024-03-20 ASSESSMENT — INTRAOCULAR PRESSURE
OS: 18
OD: 7

## 2024-06-18 ENCOUNTER — OFFICE VISIT (OUTPATIENT)
Dept: URBAN - METROPOLITAN AREA CLINIC 32 | Facility: CLINIC | Age: 64
End: 2024-06-18
Payer: COMMERCIAL

## 2024-06-18 DIAGNOSIS — Z94.7 CORNEAL TRANSPLANT STATUS: Primary | ICD-10-CM

## 2024-06-18 PROCEDURE — 99213 OFFICE O/P EST LOW 20 MIN: CPT | Performed by: OPHTHALMOLOGY

## 2024-06-18 ASSESSMENT — INTRAOCULAR PRESSURE
OS: 18
OD: 27

## 2024-07-17 ENCOUNTER — OFFICE VISIT (OUTPATIENT)
Dept: URBAN - METROPOLITAN AREA CLINIC 51 | Facility: CLINIC | Age: 64
End: 2024-07-17
Payer: MEDICARE

## 2024-07-17 DIAGNOSIS — H40.1114 PRIMARY OPEN-ANGLE GLAUCOMA, RIGHT EYE, INDETERMINATE STAGE: ICD-10-CM

## 2024-07-17 DIAGNOSIS — Z94.7 CORNEAL TRANSPLANT STATUS: ICD-10-CM

## 2024-07-17 DIAGNOSIS — H40.1121 PRIMARY OPEN-ANGLE GLAUCOMA, MILD STAGE, LEFT EYE: Primary | ICD-10-CM

## 2024-07-17 PROCEDURE — 99215 OFFICE O/P EST HI 40 MIN: CPT | Performed by: STUDENT IN AN ORGANIZED HEALTH CARE EDUCATION/TRAINING PROGRAM

## 2024-07-17 PROCEDURE — 92133 CPTRZD OPH DX IMG PST SGM ON: CPT | Performed by: STUDENT IN AN ORGANIZED HEALTH CARE EDUCATION/TRAINING PROGRAM

## 2024-07-17 PROCEDURE — 92083 EXTENDED VISUAL FIELD XM: CPT | Performed by: STUDENT IN AN ORGANIZED HEALTH CARE EDUCATION/TRAINING PROGRAM

## 2024-07-17 RX ORDER — DORZOLAMIDE HYDROCHLORIDE AND TIMOLOL MALEATE 20; 5 MG/ML; MG/ML
SOLUTION/ DROPS OPHTHALMIC
Qty: 30 | Refills: 1 | Status: ACTIVE
Start: 2024-07-17

## 2024-07-17 ASSESSMENT — INTRAOCULAR PRESSURE
OS: 17
OD: 21